# Patient Record
Sex: MALE | Race: WHITE | NOT HISPANIC OR LATINO | URBAN - METROPOLITAN AREA
[De-identification: names, ages, dates, MRNs, and addresses within clinical notes are randomized per-mention and may not be internally consistent; named-entity substitution may affect disease eponyms.]

---

## 2017-08-05 ENCOUNTER — EMERGENCY (EMERGENCY)
Facility: HOSPITAL | Age: 22
LOS: 1 days | Discharge: HOME | End: 2017-08-05
Admitting: EMERGENCY MEDICINE
Payer: COMMERCIAL

## 2017-08-05 VITALS
TEMPERATURE: 98 F | DIASTOLIC BLOOD PRESSURE: 73 MMHG | OXYGEN SATURATION: 99 % | HEART RATE: 87 BPM | RESPIRATION RATE: 16 BRPM | SYSTOLIC BLOOD PRESSURE: 132 MMHG

## 2017-08-05 DIAGNOSIS — S50.811A ABRASION OF RIGHT FOREARM, INITIAL ENCOUNTER: ICD-10-CM

## 2017-08-05 DIAGNOSIS — S40.211A ABRASION OF RIGHT SHOULDER, INITIAL ENCOUNTER: ICD-10-CM

## 2017-08-05 DIAGNOSIS — S01.81XA LACERATION WITHOUT FOREIGN BODY OF OTHER PART OF HEAD, INITIAL ENCOUNTER: ICD-10-CM

## 2017-08-05 DIAGNOSIS — Z79.2 LONG TERM (CURRENT) USE OF ANTIBIOTICS: ICD-10-CM

## 2017-08-05 PROCEDURE — 99283 EMERGENCY DEPT VISIT LOW MDM: CPT | Mod: 25

## 2017-08-05 PROCEDURE — 12011 RPR F/E/E/N/L/M 2.5 CM/<: CPT

## 2017-08-05 RX ORDER — BACITRACIN ZINC 500 UNIT/G
1 OINTMENT IN PACKET (EA) TOPICAL ONCE
Qty: 0 | Refills: 0 | Status: COMPLETED | OUTPATIENT
Start: 2017-08-05 | End: 2017-08-05

## 2017-08-05 RX ORDER — CEPHALEXIN 500 MG
1 CAPSULE ORAL
Qty: 28 | Refills: 0 | OUTPATIENT
Start: 2017-08-05 | End: 2017-08-12

## 2017-08-05 RX ORDER — CEPHALEXIN 500 MG
500 CAPSULE ORAL ONCE
Qty: 0 | Refills: 0 | Status: COMPLETED | OUTPATIENT
Start: 2017-08-05 | End: 2017-08-05

## 2017-08-05 RX ADMIN — Medication 500 MILLIGRAM(S): at 19:39

## 2017-08-05 RX ADMIN — Medication 1 APPLICATION(S): at 20:01

## 2017-08-05 NOTE — ED PROVIDER NOTE - OBJECTIVE STATEMENT
patient presents with laceration to the face after falling off his skate board while jumping, landing on R side of face, R shoulder, and R forearm. denies LOC, dizziness, focal weakness, paresthesias. accident occurred 16 hours ago

## 2017-08-05 NOTE — ED PROVIDER NOTE - MEDICAL DECISION MAKING DETAILS
patient with laceration to the R side of face and abrasions to the R shoulder and elbow after falling from his skateboard 16 hours go. laceration repair without complication. first dose of keflex given in ED. rx sent, bactracin applied to all wounds. all precautions reviewed

## 2017-08-05 NOTE — ED ADULT TRIAGE NOTE - CHIEF COMPLAINT QUOTE
fall off skateboard ay 1am with abrasion and pain with ROM right shoulder and right facial laceration above eyebrow

## 2017-08-05 NOTE — ED PROVIDER NOTE - PHYSICAL EXAMINATION
abrasion over R shoulder, and R forearm, superficial no active bleeding.  y shaped 2cm linear laceraion over the R forehead, no active bleeding with over lying abrasion
